# Patient Record
Sex: FEMALE | Race: WHITE | Employment: FULL TIME | URBAN - METROPOLITAN AREA
[De-identification: names, ages, dates, MRNs, and addresses within clinical notes are randomized per-mention and may not be internally consistent; named-entity substitution may affect disease eponyms.]

---

## 2017-01-18 DIAGNOSIS — Z12.31 ENCOUNTER FOR SCREENING MAMMOGRAM FOR MALIGNANT NEOPLASM OF BREAST: Primary | ICD-10-CM

## 2017-02-14 DIAGNOSIS — Z12.31 ENCOUNTER FOR SCREENING MAMMOGRAM FOR MALIGNANT NEOPLASM OF BREAST: ICD-10-CM

## 2017-06-05 ENCOUNTER — TELEPHONE (OUTPATIENT)
Dept: OBGYN CLINIC | Age: 52
End: 2017-06-05

## 2017-07-17 ENCOUNTER — OFFICE VISIT (OUTPATIENT)
Dept: OBGYN CLINIC | Age: 52
End: 2017-07-17
Payer: COMMERCIAL

## 2017-07-17 ENCOUNTER — HOSPITAL ENCOUNTER (OUTPATIENT)
Age: 52
Setting detail: SPECIMEN
Discharge: HOME | End: 2017-07-17
Payer: COMMERCIAL

## 2017-07-17 VITALS
WEIGHT: 184 LBS | SYSTOLIC BLOOD PRESSURE: 122 MMHG | DIASTOLIC BLOOD PRESSURE: 82 MMHG | BODY MASS INDEX: 31.41 KG/M2 | HEIGHT: 64 IN

## 2017-07-17 DIAGNOSIS — Z12.31 ENCOUNTER FOR SCREENING MAMMOGRAM FOR BREAST CANCER: ICD-10-CM

## 2017-07-17 DIAGNOSIS — Z01.419 ENCOUNTER FOR GYNECOLOGICAL EXAMINATION WITHOUT ABNORMAL FINDING: Primary | ICD-10-CM

## 2017-07-17 PROCEDURE — 99396 PREV VISIT EST AGE 40-64: CPT | Performed by: NURSE PRACTITIONER

## 2017-07-17 RX ORDER — GARLIC 180 MG
40 TABLET, DELAYED RELEASE (ENTERIC COATED) ORAL DAILY
COMMUNITY
End: 2020-07-13

## 2017-07-17 RX ORDER — SITAGLIPTIN 100 MG/1
TABLET, FILM COATED ORAL
COMMUNITY
Start: 2017-07-12 | End: 2019-07-11 | Stop reason: CLARIF

## 2017-07-17 ASSESSMENT — ENCOUNTER SYMPTOMS
ABDOMINAL DISTENTION: 0
ABDOMINAL PAIN: 0
DIARRHEA: 0
BACK PAIN: 0
COUGH: 0
CONSTIPATION: 0
SHORTNESS OF BREATH: 0

## 2017-07-20 LAB — CYTOLOGY REPORT: NORMAL

## 2018-07-11 ENCOUNTER — HOSPITAL ENCOUNTER (OUTPATIENT)
Age: 53
Setting detail: SPECIMEN
Discharge: HOME | End: 2018-07-11
Payer: COMMERCIAL

## 2018-07-11 ENCOUNTER — OFFICE VISIT (OUTPATIENT)
Dept: OBGYN CLINIC | Age: 53
End: 2018-07-11
Payer: COMMERCIAL

## 2018-07-11 VITALS
HEIGHT: 64 IN | WEIGHT: 187 LBS | BODY MASS INDEX: 31.92 KG/M2 | DIASTOLIC BLOOD PRESSURE: 84 MMHG | SYSTOLIC BLOOD PRESSURE: 136 MMHG

## 2018-07-11 DIAGNOSIS — Z01.419 ENCOUNTER FOR GYNECOLOGICAL EXAMINATION: Primary | ICD-10-CM

## 2018-07-11 DIAGNOSIS — Z12.31 ENCOUNTER FOR SCREENING MAMMOGRAM FOR BREAST CANCER: ICD-10-CM

## 2018-07-11 PROCEDURE — 99396 PREV VISIT EST AGE 40-64: CPT | Performed by: NURSE PRACTITIONER

## 2018-07-11 ASSESSMENT — ENCOUNTER SYMPTOMS
SHORTNESS OF BREATH: 0
ABDOMINAL PAIN: 0
COUGH: 0
CONSTIPATION: 0
DIARRHEA: 0
ABDOMINAL DISTENTION: 0
BACK PAIN: 0

## 2018-07-11 NOTE — PROGRESS NOTES
High Blood Pressure Paternal Uncle     Cancer Paternal Uncle         bladder,prostate    Diabetes Paternal Uncle     Heart Disease Paternal Grandmother     High Blood Pressure Paternal Grandmother     Other Mother         manic    Thyroid Disease Mother      Social History   Substance Use Topics    Smoking status: Never Smoker    Smokeless tobacco: Never Used    Alcohol use Yes        Subjective:      Review of Systems   Constitutional: Negative for appetite change and fatigue. HENT: Negative for congestion and hearing loss. Eyes: Negative for visual disturbance. Respiratory: Negative for cough and shortness of breath. Cardiovascular: Negative for chest pain and palpitations. Gastrointestinal: Negative for abdominal distention, abdominal pain, constipation and diarrhea. Genitourinary: Negative for flank pain, frequency, menstrual problem, pelvic pain and vaginal discharge. Musculoskeletal: Negative for back pain. Neurological: Negative for syncope and headaches. Psychiatric/Behavioral: Negative for behavioral problems. Objective:     Ht 5' 3.5\" (1.613 m)   Wt 187 lb (84.8 kg)   LMP 06/01/2017   BMI 32.61 kg/m²   Physical Exam   Constitutional: She is oriented to person, place, and time. She appears well-developed and well-nourished. Eyes: Pupils are equal, round, and reactive to light. Neck: No tracheal deviation present. No thyromegaly present. Cardiovascular: Normal rate, regular rhythm and normal heart sounds. Pulmonary/Chest: Effort normal and breath sounds normal. No respiratory distress. Right breast exhibits no inverted nipple. Left breast exhibits no inverted nipple, no mass, no nipple discharge, no skin change and no tenderness. Breasts are symmetrical.   Abdominal: Soft. Bowel sounds are normal. She exhibits no distension and no mass. There is no tenderness. There is no CVA tenderness.  Hernia confirmed negative in the right inguinal area and confirmed negative

## 2018-07-18 ENCOUNTER — TELEPHONE (OUTPATIENT)
Dept: OBGYN CLINIC | Age: 53
End: 2018-07-18

## 2018-07-28 LAB — CYTOLOGY REPORT: NORMAL

## 2018-08-16 ENCOUNTER — HOSPITAL ENCOUNTER (OUTPATIENT)
Age: 53
Setting detail: SPECIMEN
Discharge: HOME | End: 2018-08-16
Payer: COMMERCIAL

## 2018-08-16 ENCOUNTER — OFFICE VISIT (OUTPATIENT)
Dept: OBGYN CLINIC | Age: 53
End: 2018-08-16

## 2018-08-16 VITALS
WEIGHT: 190.2 LBS | BODY MASS INDEX: 32.47 KG/M2 | HEIGHT: 64 IN | SYSTOLIC BLOOD PRESSURE: 114 MMHG | DIASTOLIC BLOOD PRESSURE: 82 MMHG

## 2018-08-16 DIAGNOSIS — R87.615 ENCOUNTER FOR REPEAT PAP SMEAR DUE TO PREVIOUS INSUFF CERVICAL CELLS: Primary | ICD-10-CM

## 2018-09-06 LAB — CYTOLOGY REPORT: NORMAL

## 2019-03-04 DIAGNOSIS — Z12.31 ENCOUNTER FOR SCREENING MAMMOGRAM FOR BREAST CANCER: ICD-10-CM

## 2019-05-13 ENCOUNTER — TELEPHONE (OUTPATIENT)
Dept: OBGYN CLINIC | Age: 54
End: 2019-05-13

## 2019-05-13 NOTE — TELEPHONE ENCOUNTER
Pt calling states she is receiving bill regarding her return visit 08/16/18 for insufficient cells on her pap. Pt state her bill is for 109.00 and was told there would be no charge. Emailed Payor[facility]Enrollem[facility] at [facility] to make adjustments to pt bill.

## 2019-05-17 NOTE — TELEPHONE ENCOUNTER
Pt calling back today to follow up on billing issue. Additional information sent to Cindy Auguste in billing dept in [place]. [facility] for pt that Cindy Auguste in billing dept in [place] needed additional information to handle billing issue. That has been submitted today 05/17/19.

## 2019-07-11 ENCOUNTER — HOSPITAL ENCOUNTER (OUTPATIENT)
Age: 54
Setting detail: SPECIMEN
Discharge: HOME | End: 2019-07-11
Payer: COMMERCIAL

## 2019-07-11 ENCOUNTER — OFFICE VISIT (OUTPATIENT)
Dept: OBGYN CLINIC | Age: 54
End: 2019-07-11
Payer: COMMERCIAL

## 2019-07-11 VITALS
BODY MASS INDEX: 31.14 KG/M2 | DIASTOLIC BLOOD PRESSURE: 86 MMHG | SYSTOLIC BLOOD PRESSURE: 114 MMHG | HEIGHT: 64 IN | WEIGHT: 182.4 LBS

## 2019-07-11 DIAGNOSIS — Z01.419 ENCOUNTER FOR GYNECOLOGICAL EXAMINATION: ICD-10-CM

## 2019-07-11 DIAGNOSIS — Z12.39 BREAST CANCER SCREENING: ICD-10-CM

## 2019-07-11 DIAGNOSIS — Z12.31 ENCOUNTER FOR SCREENING MAMMOGRAM FOR BREAST CANCER: Primary | ICD-10-CM

## 2019-07-11 DIAGNOSIS — Z01.419 ENCOUNTER FOR WELL WOMAN EXAM WITH ROUTINE GYNECOLOGICAL EXAM: Primary | ICD-10-CM

## 2019-07-11 PROCEDURE — 99396 PREV VISIT EST AGE 40-64: CPT | Performed by: NURSE PRACTITIONER

## 2019-07-11 RX ORDER — EMPAGLIFLOZIN 10 MG/1
TABLET, FILM COATED ORAL
COMMUNITY
Start: 2019-06-08

## 2019-07-11 ASSESSMENT — ENCOUNTER SYMPTOMS
ABDOMINAL PAIN: 0
BACK PAIN: 0
DIARRHEA: 0
SHORTNESS OF BREATH: 0
COUGH: 0
ABDOMINAL DISTENTION: 0
CONSTIPATION: 0

## 2019-07-16 LAB — CYTOLOGY REPORT: NORMAL

## 2019-07-24 ENCOUNTER — TELEPHONE (OUTPATIENT)
Dept: OBGYN CLINIC | Age: 54
End: 2019-07-24

## 2020-07-13 ENCOUNTER — OFFICE VISIT (OUTPATIENT)
Dept: OBGYN CLINIC | Age: 55
End: 2020-07-13
Payer: COMMERCIAL

## 2020-07-13 ENCOUNTER — HOSPITAL ENCOUNTER (OUTPATIENT)
Age: 55
Setting detail: SPECIMEN
Discharge: HOME | End: 2020-07-13
Payer: COMMERCIAL

## 2020-07-13 VITALS
HEIGHT: 64 IN | WEIGHT: 181.6 LBS | DIASTOLIC BLOOD PRESSURE: 78 MMHG | BODY MASS INDEX: 31 KG/M2 | SYSTOLIC BLOOD PRESSURE: 126 MMHG

## 2020-07-13 PROCEDURE — 99396 PREV VISIT EST AGE 40-64: CPT | Performed by: NURSE PRACTITIONER

## 2020-07-13 ASSESSMENT — ENCOUNTER SYMPTOMS
COUGH: 0
CONSTIPATION: 0
ABDOMINAL DISTENTION: 0
SHORTNESS OF BREATH: 0
BACK PAIN: 0
DIARRHEA: 0
ABDOMINAL PAIN: 0

## 2020-07-13 NOTE — PROGRESS NOTES
HPI:     Ciaran Mcgee a 60 y.o. female who presents today for:  Chief Complaint   Patient presents with    Annual Exam     last pap 7/11/19-WNL last mammogram 3/9/20-WNL        HPI  Here for annual exam.  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 No children. Walking 5x/week and trying to eat healthy. GYNECOLOGICHISTORY:  MenstrualHistory: No LMP recorded. Sexually Transmitted Infections: No  History of Ectopic Pregnancy:No  Denies VB  Sexually active: yes- men only  Dyspareunia: some dryness    Current Outpatient Medications   Medication Sig Dispense Refill    JARDIANCE 10 MG tablet       Black Cohosh 40 MG CAPS Take 40 mg by mouth daily      acetaminophen (TYLENOL) 325 MG tablet Take 650 mg by mouth every 6 hours as needed for Pain      Cetirizine HCl (ZYRTEC PO) Take by mouth      DiphenhydrAMINE HCl (BENADRYL ALLERGY PO) Take by mouth      atorvastatin (LIPITOR) 10 MG tablet Take 10 mg by mouth daily. No current facility-administered medications for this visit. Allergies   Allergen Reactions    Bactrim [Sulfamethoxazole-Trimethoprim] Hives    Asa-Fd&C Yellow #6-Saccharin [Aspirin] Nausea And Vomiting    Metformin And Related Nausea And Vomiting       Past Medical History:   Diagnosis Date    Hyperlipidemia     Type II or unspecified type diabetes mellitus without mention of complication, not stated as uncontrolled     diet controlled.  no meds 6/14     Denies family history of breast, ovarian, uterus, colon CA     Past Surgical History:   Procedure Laterality Date    ANKLE SURGERY  1997    fx lt ankle    BREAST SURGERY  3/09    sterotactic breast biopsy    COLONOSCOPY  2015    DILATION AND CURETTAGE OF UTERUS      x 2    HEMORRHOID SURGERY  2004    TONSILLECTOMY      t&a     Family History   Problem Relation Age of Onset    Cancer Father         skin    High Blood Pressure Paternal Uncle     Cancer Paternal Uncle         bladder,prostate   

## 2020-07-17 LAB — CYTOLOGY REPORT: NORMAL

## 2021-04-12 DIAGNOSIS — Z12.31 ENCOUNTER FOR SCREENING MAMMOGRAM FOR BREAST CANCER: ICD-10-CM

## 2021-08-18 ENCOUNTER — OFFICE VISIT (OUTPATIENT)
Dept: OBGYN CLINIC | Age: 56
End: 2021-08-18
Payer: COMMERCIAL

## 2021-08-18 ENCOUNTER — HOSPITAL ENCOUNTER (OUTPATIENT)
Age: 56
Setting detail: SPECIMEN
Discharge: HOME | End: 2021-08-18
Payer: COMMERCIAL

## 2021-08-18 VITALS
HEIGHT: 64 IN | SYSTOLIC BLOOD PRESSURE: 122 MMHG | BODY MASS INDEX: 32.44 KG/M2 | WEIGHT: 190 LBS | DIASTOLIC BLOOD PRESSURE: 78 MMHG

## 2021-08-18 DIAGNOSIS — R21 SKIN RASH: ICD-10-CM

## 2021-08-18 DIAGNOSIS — Z12.31 ENCOUNTER FOR SCREENING MAMMOGRAM FOR BREAST CANCER: ICD-10-CM

## 2021-08-18 DIAGNOSIS — Z01.419 ENCOUNTER FOR GYNECOLOGICAL EXAMINATION: Primary | ICD-10-CM

## 2021-08-18 DIAGNOSIS — N76.0 ACUTE VAGINITIS: ICD-10-CM

## 2021-08-18 LAB
DIRECT EXAM: ABNORMAL
Lab: ABNORMAL
SPECIMEN DESCRIPTION: ABNORMAL

## 2021-08-18 PROCEDURE — 99396 PREV VISIT EST AGE 40-64: CPT | Performed by: NURSE PRACTITIONER

## 2021-08-18 RX ORDER — FLUCONAZOLE 150 MG/1
150 TABLET ORAL EVERY OTHER DAY
Qty: 3 TABLET | Refills: 0 | Status: SHIPPED | OUTPATIENT
Start: 2021-08-18 | End: 2022-06-23 | Stop reason: SDUPTHER

## 2021-08-18 RX ORDER — NYSTATIN 100000 [USP'U]/G
POWDER TOPICAL
Qty: 1 BOTTLE | Refills: 5 | Status: SHIPPED | OUTPATIENT
Start: 2021-08-18 | End: 2022-06-23 | Stop reason: SDUPTHER

## 2021-08-18 RX ORDER — GLIPIZIDE 5 MG/1
5 TABLET, FILM COATED, EXTENDED RELEASE ORAL DAILY
COMMUNITY
Start: 2021-06-29

## 2021-08-18 ASSESSMENT — ENCOUNTER SYMPTOMS
ABDOMINAL DISTENTION: 0
DIARRHEA: 0
SHORTNESS OF BREATH: 0
BACK PAIN: 0
ABDOMINAL PAIN: 0
COUGH: 0
CONSTIPATION: 0

## 2021-08-18 NOTE — PATIENT INSTRUCTIONS
Patient Education        Learning About Calcium  What is calcium? Calcium keeps your bones and muscles--including your heart--healthy and strong. Your body needs vitamin D to absorb calcium. People who don't get enough calcium and vitamin D throughout life have an increased chance of having thin and brittle bones (osteoporosis) in their later years. Thin and brittle bones break easily. They can lead to serious injuries. This is why it's important for you to get enough calcium and vitamin D as a child and as an adult. It helps keep your bones strong as you get older. And it protects you against possible breaks. Your body also uses vitamin D to help your muscles absorb calcium and work well. If your muscles don't get enough calcium, then they can cramp, hurt, or feel weak. You may have long-term (chronic) muscle aches and pains. How much calcium do you need? How much calcium you need each day changes as you age. Here are the recommended daily allowances (RDAs) for calcium:  · Ages 1 to 3 years: 700 milligrams  · Ages 4 to 8 years: 1,000 milligrams  · Ages 5 to 25 years: 1,300 milligrams  · Ages 23 to 48 years: 1,000 milligrams  · Males 46 to 79 years: 1,000 milligrams  · Females 46 to 79 years: 1,200 milligrams  · Ages 70 and older: 1,200 milligrams  Women who are pregnant or breastfeeding need the same amount of calcium and vitamin D as other women their age. How can you get enough calcium? Calcium is in foods such as milk, cheese, and yogurt. Vegetables like broccoli, kale, and Chinese cabbage also have it. You can get calcium if you eat the soft edible bones in canned sardines and canned salmon. Foods with added (fortified) calcium include some cereals, juices, soy drinks, and tofu. The food label will show how much of it was added. You can figure out how much calcium is in a food by looking at the percent daily value section on the nutrition facts label.  The food label assumes the daily value of calcium is 1,300 mg. So if one serving of a food has a daily value of 20% of calcium, that food has 260 mg of calcium in one serving. Some people who don't get enough calcium may need supplements. Two common calcium supplements are calcium citrate and calcium carbonate. Calcium carbonate is best absorbed when it is taken with food. Calcium citrate can be absorbed well with or without food. Spreading calcium out over the course of the day can reduce stomach upset. And your body absorbs it better when it is spread over the day. Try not to take more than 500 mg of calcium supplement at a time. Where can you learn more? Go to [email or web address] org and sign in to your [employer] account. Enter S264 in the [employer] box to learn more about \"Learning About Calcium. \"     If you do not have an account, please click on the \"Sign Up Now\" link. Current as of: December 17, 2020               Content Version: 12.9  © 2006-2021 Healthwise, Incorporated. Care instructions adapted under license by [facility] ([facility]). If you have questions about a medical condition or this instruction, always ask your healthcare professional. [place] [address] any warranty or liability for your use of this information.

## 2021-08-18 NOTE — PROGRESS NOTES
HPI:     Kaushal Haines a 60 y.o. female who presents today for:  Chief Complaint   Patient presents with    Annual Exam     Prev Pap: 7/13/20 WNL; Prev Jack: 4/10/21 WNL    Vaginal Itching     Itching, no other symptoms. Reaction to her Jardiance? ?       HPI  Here for annual exam. Recently engaged. Works as an . No children. Working on healthy eating and trying to increase activity. Working to keep HgA1c at acceptable level. Has a genital rash that flares about once a month. Very pruritic. Denies vaginal discharge. Uses vagisil for itching and wondering if r/t Jardiance. GYNECOLOGICHISTORY:  MenstrualHistory: Patient's last menstrual period was 06/04/2017. Sexually Transmitted Infections: No  History of Ectopic Pregnancy:No  Denies VB  Sexually active: yes  Dyspareunia: some dryness    Current Outpatient Medications   Medication Sig Dispense Refill    glipiZIDE (GLUCOTROL XL) 5 MG extended release tablet Take 5 mg by mouth daily      SITagliptin Phosphate (JANUVIA PO) Take by mouth      JARDIANCE 10 MG tablet       acetaminophen (TYLENOL) 325 MG tablet Take 650 mg by mouth every 6 hours as needed for Pain      Cetirizine HCl (ZYRTEC PO) Take by mouth      DiphenhydrAMINE HCl (BENADRYL ALLERGY PO) Take by mouth      atorvastatin (LIPITOR) 10 MG tablet Take 10 mg by mouth daily. No current facility-administered medications for this visit. Allergies   Allergen Reactions    Bactrim [Sulfamethoxazole-Trimethoprim] Hives    Metformin     Asa-Fd&C Yellow #6-Saccharin [Aspirin] Nausea And Vomiting    Metformin And Related Nausea And Vomiting       Past Medical History:   Diagnosis Date    Hyperlipidemia     Type II or unspecified type diabetes mellitus without mention of complication, not stated as uncontrolled     diet controlled.  no meds 6/14     Denies family history of breast, ovarian, uterus, colon CA     Past Surgical History:   Procedure Laterality Date    ANKLE SURGERY  1997    fx lt ankle    BREAST SURGERY  3/09    sterotactic breast biopsy    CATARACT REMOVAL  5/5/21, 5/25/21    COLONOSCOPY  2015    DILATION AND CURETTAGE OF UTERUS      x 2    HEMORRHOID SURGERY  2004    TONSILLECTOMY      t&a     Family History   Problem Relation Age of Onset    Cancer Father         skin    High Blood Pressure Paternal Uncle     Cancer Paternal Uncle         bladder,prostate    Diabetes Paternal Uncle     Heart Disease Paternal Grandmother     High Blood Pressure Paternal Grandmother     Other Mother         manic    Thyroid Disease Mother      Social History     Tobacco Use    Smoking status: Never Smoker    Smokeless tobacco: Never Used   Substance Use Topics    Alcohol use: Yes        Subjective:      Review of Systems   Constitutional: Negative for appetite change and fatigue. HENT: Negative for congestion and hearing loss. Eyes: Negative for visual disturbance. Respiratory: Negative for cough and shortness of breath. Cardiovascular: Negative for chest pain and palpitations. Gastrointestinal: Negative for abdominal distention, abdominal pain, constipation and diarrhea. Genitourinary: Positive for genital sores (rash). Negative for flank pain, frequency, menstrual problem, pelvic pain and vaginal discharge. Musculoskeletal: Negative for back pain. Neurological: Negative for syncope and headaches. Psychiatric/Behavioral: Negative for behavioral problems. Objective:     /78 (Position: Sitting, Cuff Size: Medium Adult)   Ht 5' 4\" (1.626 m)   Wt 190 lb (86.2 kg)   LMP 06/04/2017   BMI 32.61 kg/m²   Physical Exam  Constitutional:       Appearance: She is well-developed. HENT:      Head: Normocephalic. Eyes:      Extraocular Movements: Extraocular movements intact. Conjunctiva/sclera: Conjunctivae normal.   Neck:      Thyroid: No thyromegaly. Trachea: No tracheal deviation.    Pulmonary:      Effort: Pulmonary effort is normal. No respiratory distress. Chest:      Breasts: Breasts are symmetrical.         Right: No inverted nipple. Left: No inverted nipple, mass, nipple discharge, skin change or tenderness. Abdominal:      General: There is no distension. Palpations: Abdomen is soft. There is no mass. Tenderness: There is no abdominal tenderness. Genitourinary:     Labia:         Right: No rash or lesion. Left: No rash or lesion. Vagina: No vaginal discharge or tenderness. Cervix: No cervical motion tenderness, discharge or friability. Uterus: Not deviated, not enlarged and not fixed. Adnexa:         Right: No mass, tenderness or fullness. Left: No mass, tenderness or fullness. Musculoskeletal:         General: No tenderness. Normal range of motion. Cervical back: Normal range of motion. Skin:     General: Skin is warm and dry. Neurological:      General: No focal deficit present. Mental Status: She is alert and oriented to person, place, and time. Mental status is at baseline. Psychiatric:         Mood and Affect: Mood normal.         Behavior: Behavior normal.         Thought Content: Thought content normal.         Judgment: Judgment normal.           Assessment:     1. Encounter for gynecological examination    2. Encounter for screening mammogram for breast cancer    3. Acute vaginitis          Plan:   1. Pap collected. Discussed new pap smear guidelines. Desires re-pap in 1 year. 2. Screening mammogram discussed and advised yearly if within normal limits. 3. Calcium and Vitamin D dosing reviewed. 4. Colonoscopy screening reviewed. 5. Bone density testing reviewed.    Diflucan qod x 3  Nystatin powder to affected areas tid x 7 days  RTO 6 weeks if not resolved  Electronicallysigned by ANGIE Altamirano CNP on 8/18/2021

## 2021-08-19 RX ORDER — METRONIDAZOLE 500 MG/1
500 TABLET ORAL 2 TIMES DAILY
Qty: 14 TABLET | Refills: 0 | Status: SHIPPED | OUTPATIENT
Start: 2021-08-19 | End: 2021-08-26

## 2021-08-31 LAB — CYTOLOGY REPORT: NORMAL

## 2022-04-25 DIAGNOSIS — Z12.31 ENCOUNTER FOR SCREENING MAMMOGRAM FOR BREAST CANCER: ICD-10-CM

## 2022-06-23 RX ORDER — FLUCONAZOLE 150 MG/1
150 TABLET ORAL EVERY OTHER DAY
Qty: 3 TABLET | Refills: 0 | Status: SHIPPED | OUTPATIENT
Start: 2022-06-23 | End: 2022-06-28

## 2022-06-23 RX ORDER — NYSTATIN 100000 [USP'U]/G
POWDER TOPICAL
Qty: 30 G | Refills: 1 | Status: SHIPPED | OUTPATIENT
Start: 2022-06-23

## 2022-08-23 ENCOUNTER — OFFICE VISIT (OUTPATIENT)
Dept: OBGYN CLINIC | Age: 57
End: 2022-08-23
Payer: COMMERCIAL

## 2022-08-23 ENCOUNTER — HOSPITAL ENCOUNTER (OUTPATIENT)
Age: 57
Setting detail: SPECIMEN
Discharge: HOME | End: 2022-08-23

## 2022-08-23 VITALS
DIASTOLIC BLOOD PRESSURE: 82 MMHG | SYSTOLIC BLOOD PRESSURE: 124 MMHG | HEIGHT: 64 IN | BODY MASS INDEX: 32.27 KG/M2 | WEIGHT: 189 LBS

## 2022-08-23 DIAGNOSIS — Z01.419 ENCOUNTER FOR GYNECOLOGICAL EXAMINATION: Primary | ICD-10-CM

## 2022-08-23 DIAGNOSIS — Z12.31 ENCOUNTER FOR SCREENING MAMMOGRAM FOR BREAST CANCER: ICD-10-CM

## 2022-08-23 PROCEDURE — 99396 PREV VISIT EST AGE 40-64: CPT

## 2022-08-23 RX ORDER — LINAGLIPTIN 5 MG/1
TABLET, FILM COATED ORAL
COMMUNITY
Start: 2022-08-14

## 2022-08-23 NOTE — PROGRESS NOTES
[facility] [address] [address] [facility] [address]  Dept: [phone]           Patient: Isaak Figueroa  Primary Care Physician: Nikolai Guillen   Chief Complaint   Patient presents with    Annual Exam     Prev Pap:21 WNL; Prev Jack: 22 WNL      HPI: Isaak Figueroa is a 59 y.o. [id number] who presents today for her annual women's wellness exam. She works as an . She is working on adding healthy habits to her routine. Goes to the gym and walks to stay healthy. Became a  5 years ago, Getting  next year and enjoying wedding planning. Groin itches externally within past few days but nystatin powder helps. Notes some vaginal dryness. She uses vagisil with some frequency for itching. OBSTETRICAL & GYNECOLOGICAL HISTORY:  OB History    Para Term  AB Living   2 0 0 0 2 0   SAB IAB Ectopic Molar Multiple Live Births   0 0 0 0 0 0      # Outcome Date GA Lbr Fahad/2nd Weight Sex Delivery Anes PTL Lv   2 AB            1 AB              Patient's last menstrual period was 2017. Menopause status: postmenopausal   Taking Calcium/Vitamin D supplement    Sexually Active:  yes  Dyspareunia: Yes occasionally  STD History: No  Birth Control: post menopausal status    FAMILY HISTORY:  Family History of Breast, Ovarian, Colon or Uterine Cancer: No     family history includes Cancer in her father and paternal uncle; Diabetes in her paternal uncle; Heart Disease in her paternal grandmother; High Blood Pressure in her paternal grandmother and paternal uncle; Other in her mother; Thyroid Disease in her mother. SOCIAL HISTORY:    reports that she has never smoked. She has never used smokeless tobacco. She reports current alcohol use. She reports that she does not use drugs.     MEDICAL HISTORY:  is allergic to bactrim [sulfamethoxazole-trimethoprim], metformin, asa-fd&c yellow #6-saccharin [aspirin], and metformin and related. CURRENT MEDS W/ ASSOC DIAG           Start Date End Date     acetaminophen (TYLENOL) 325 MG tablet  --  --     Associated Diagnoses:  --     atorvastatin (LIPITOR) 10 MG tablet  --  --     Associated Diagnoses:  Routine gynecological examination     Cetirizine HCl (ZYRTEC PO)  --  --     Associated Diagnoses:  --     DiphenhydrAMINE HCl (BENADRYL ALLERGY PO)  --  --     Associated Diagnoses:  --     glipiZIDE (GLUCOTROL XL) 5 MG extended release tablet  06/29/21  --     Associated Diagnoses:  --     JARDIANCE 10 MG tablet  06/08/19  --     Associated Diagnoses:  --     nystatin (MYCOSTATIN) 352506 UNIT/GM powder  06/23/22  --     Apply 3 times daily. Associated Diagnoses:  --                 Flag for Provider Review           Start Date End Date     SITagliptin Phosphate (JANUVIA PO)  --  --     Associated Diagnoses:  --            has a past medical history of Hyperlipidemia and Type II or unspecified type diabetes mellitus without mention of complication, not stated as uncontrolled. has a past surgical history that includes Tonsillectomy; Dilation and curettage of uterus; Breast surgery (3/09); Hemorrhoid surgery (2004); Ankle surgery (1997); Colonoscopy (2015); and Cataract removal (5/5/21, 5/25/21). HEALTH MAINTENANCE:  -Covid vaccine and booster recommended. Annual flu vaccine recommended October-April.   -Discussed Gardisil counseling for all patients 10-37 yo.  -Shingles vaccine:  2 doses Shingrix recommended for adults >50y. o, Single dose Zostavax live vaccine recommended for adults >57 y.o.    -Pap Smear collected today    History:was normal    -Mammogram due: now, ordered today   Last mammogram was normal  -Colonoscopy due: recommended starting at age 39 Last colonoscopy was at age 59 - no polyps removed    -DEXA due: recommended beginning at age 72 REVIEW OF SYSTEMS:   Review of Systems   Constitutional:  Negative for chills, fatigue and fever. Genitourinary:  Negative for decreased urine volume, difficulty urinating, dyspareunia, dysuria, frequency, genital sores, hematuria, menstrual problem, pelvic pain, urgency, vaginal bleeding, vaginal discharge and vaginal pain. All other systems reviewed and are negative. PHYSICAL EXAM:   Vitals:    08/23/22 0840   BP: 124/82   Position: Sitting   Cuff Size: Medium Adult   Weight: 189 lb (85.7 kg)   Height: 5' 4\" (1.626 m)      Physical Exam  Constitutional:       General: She is awake. She is not in acute distress. Appearance: Normal appearance. She is well-developed and well-groomed. She is not ill-appearing, toxic-appearing or diaphoretic. Genitourinary:      Urethral meatus normal.      Right Labia: No rash, tenderness, lesions, skin changes or Bartholin's cyst.     Left Labia: No tenderness, lesions, skin changes, Bartholin's cyst or rash. No vaginal discharge or tenderness. No cervical motion tenderness, discharge or friability. Breasts:     Breasts are symmetrical.      Breasts are soft. Right: Present. No swelling, bleeding, inverted nipple, mass, nipple discharge, skin change, tenderness, breast implant, axillary adenopathy or supraclavicular adenopathy. Left: Present. No swelling, bleeding, inverted nipple, mass, nipple discharge, skin change, tenderness, breast implant, axillary adenopathy or supraclavicular adenopathy. HENT:      Head: Normocephalic and atraumatic. Nose: Nose normal.   Eyes:      Extraocular Movements: Extraocular movements intact. Pulmonary:      Effort: Pulmonary effort is normal.   Musculoskeletal:         General: Normal range of motion. Cervical back: Normal range of motion. Lymphadenopathy:      Upper Body:      Right upper body: No supraclavicular or axillary adenopathy.       Left upper body: No supraclavicular or axillary adenopathy. Neurological:      General: No focal deficit present. Mental Status: She is alert and oriented to person, place, and time. Mental status is at baseline. Psychiatric:         Attention and Perception: Attention and perception normal.         Mood and Affect: Mood normal.         Speech: Speech normal.         Behavior: Behavior normal. Behavior is cooperative. Thought Content: Thought content normal.         Cognition and Memory: Cognition and memory normal.         Judgment: Judgment normal.   Vitals reviewed. ASSESSMENT & PLAN:    Gabbie Todd is a 59 y.o. female  here for her annual women's wellness exam. Today, we discussed    Diagnosis Orders   1. Encounter for gynecological examination  PAP Smear      2. Encounter for screening mammogram for breast cancer  ELEANOR JUAN PABLO DIGITAL SCREEN BILATERAL        Encouraged coconut oil use or Replense to treat vaginal dryness    No follow-ups on file. Counseling Completed:  -Discussed recommendations to repeat pap as per American Society for Colposcopy and Cervical Pathology guidelines.  -Discussed need for mammograms every 1 year, If >44 yo and last mammogram was negative.  -Discussed Calcium and Vitamin D dosing.  -Discussed need for colonoscopy screening as well as onset for bone density testing.  -Discussed birth control and barrier recommendations. Discussed STD counseling and prevention.  -Hereditary Breast, Ovarian, Colon and Uterine Cancer screening discussed.          -Tobacco & Secondary smoke risks discussed; with recommendation for cessation and avoidance.  -Routine health maintenance per patients PCP discussed. Return to this office annually and PRN.     The patient was seen and evaluated face to face by ANGIE Cabrera CNP   2022, 10:15 PM

## 2022-09-02 LAB — CYTOLOGY REPORT: NORMAL

## 2023-01-14 ENCOUNTER — HOSPITAL ENCOUNTER (OUTPATIENT)
Age: 58
Discharge: HOME | End: 2023-01-14
Payer: COMMERCIAL

## 2023-01-14 ENCOUNTER — HOSPITAL ENCOUNTER (OUTPATIENT)
Age: 58
Discharge: HOME | End: 2023-01-14

## 2023-01-14 LAB
ALBUMIN SERPL-MCNC: 4.3 G/DL (ref 3.5–5.2)
ALBUMIN/GLOBULIN RATIO: 1.3 (ref 1–2.5)
ALP BLD-CCNC: 99 U/L (ref 35–104)
ALT SERPL-CCNC: 15 U/L (ref 5–33)
ANION GAP SERPL CALCULATED.3IONS-SCNC: 15 MMOL/L (ref 9–17)
AST SERPL-CCNC: 17 U/L
BILIRUB SERPL-MCNC: 0.5 MG/DL (ref 0.3–1.2)
BUN BLDV-MCNC: 17 MG/DL (ref 6–20)
CALCIUM SERPL-MCNC: 9.5 MG/DL (ref 8.6–10.4)
CHLORIDE BLD-SCNC: 106 MMOL/L (ref 98–107)
CHOLESTEROL, FASTING: 174 MG/DL
CHOLESTEROL/HDL RATIO: 3
CO2: 23 MMOL/L (ref 20–31)
CREAT SERPL-MCNC: 0.59 MG/DL (ref 0.5–0.9)
CREATININE URINE: 86.6 MG/DL (ref 28–217)
GFR SERPL CREATININE-BSD FRML MDRD: >60 ML/MIN/1.73M2
GLUCOSE BLD-MCNC: 121 MG/DL (ref 70–99)
HDLC SERPL-MCNC: 58 MG/DL
LDL CHOLESTEROL: 95 MG/DL (ref 0–130)
MICROALBUMIN/CREAT 24H UR: 17 MG/L
MICROALBUMIN/CREAT UR-RTO: 20 MCG/MG CREAT
POTASSIUM SERPL-SCNC: 4.4 MMOL/L (ref 3.7–5.3)
SODIUM BLD-SCNC: 144 MMOL/L (ref 135–144)
TOTAL PROTEIN: 7.5 G/DL (ref 6.4–8.3)
TRIGLYCERIDE, FASTING: 106 MG/DL

## 2023-01-14 PROCEDURE — 36415 COLL VENOUS BLD VENIPUNCTURE: CPT

## 2023-01-14 PROCEDURE — 80053 COMPREHEN METABOLIC PANEL: CPT

## 2023-01-14 PROCEDURE — 83036 HEMOGLOBIN GLYCOSYLATED A1C: CPT

## 2023-01-14 PROCEDURE — 82043 UR ALBUMIN QUANTITATIVE: CPT

## 2023-01-14 PROCEDURE — 82570 ASSAY OF URINE CREATININE: CPT

## 2023-01-14 PROCEDURE — 80061 LIPID PANEL: CPT

## 2023-01-15 LAB
ESTIMATED AVERAGE GLUCOSE: 154 MG/DL
HBA1C MFR BLD: 7 % (ref 4–6)

## 2023-06-06 ENCOUNTER — HOSPITAL ENCOUNTER (OUTPATIENT)
Dept: MAMMOGRAPHY | Age: 58
Discharge: HOME | End: 2023-06-08
Payer: COMMERCIAL

## 2023-06-06 DIAGNOSIS — Z12.31 ENCOUNTER FOR SCREENING MAMMOGRAM FOR BREAST CANCER: ICD-10-CM

## 2023-06-06 PROCEDURE — 77063 BREAST TOMOSYNTHESIS BI: CPT
